# Patient Record
Sex: FEMALE | Race: WHITE | URBAN - METROPOLITAN AREA
[De-identification: names, ages, dates, MRNs, and addresses within clinical notes are randomized per-mention and may not be internally consistent; named-entity substitution may affect disease eponyms.]

---

## 2020-10-14 ENCOUNTER — Encounter (OUTPATIENT)
Dept: URBAN - METROPOLITAN AREA EXTERNAL CLINIC 14 | Facility: EXTERNAL CLINIC | Age: 70
End: 2020-10-14
Payer: MEDICARE

## 2020-10-14 PROCEDURE — 67042 VIT FOR MACULAR HOLE: CPT | Performed by: OPHTHALMOLOGY

## 2020-10-15 ENCOUNTER — POST-OPERATIVE VISIT (OUTPATIENT)
Dept: URBAN - METROPOLITAN AREA CLINIC 13 | Facility: CLINIC | Age: 70
End: 2020-10-15
Payer: MEDICARE

## 2020-10-15 DIAGNOSIS — H35.342 MACULAR HOLE OF LEFT EYE: Primary | ICD-10-CM

## 2020-10-15 PROCEDURE — 99024 POSTOP FOLLOW-UP VISIT: CPT | Performed by: OPHTHALMOLOGY

## 2020-10-15 ASSESSMENT — INTRAOCULAR PRESSURE
OD: 13
OS: 15

## 2020-10-15 NOTE — IMPRESSION/PLAN
Impression: S/P PPV,MP,GAS  X MH OD - 1 Day. Macular hole of left eye  H35.342. Retina attached. IOP WNL OU. No infection. Patient is starting Prednisolone and Ofloxacin QID OS. RTC: 1 WEEK POS.  Plan:

## 2020-11-05 ENCOUNTER — POST-OPERATIVE VISIT (OUTPATIENT)
Dept: URBAN - NONMETROPOLITAN AREA CLINIC 5 | Facility: CLINIC | Age: 70
End: 2020-11-05
Payer: MEDICARE

## 2020-11-05 DIAGNOSIS — H35.341 MACULAR CYST, HOLE, OR PSEUDOHOLE, RIGHT EYE: Primary | ICD-10-CM

## 2020-11-05 PROCEDURE — 99024 POSTOP FOLLOW-UP VISIT: CPT | Performed by: OPHTHALMOLOGY

## 2020-11-05 ASSESSMENT — INTRAOCULAR PRESSURE
OS: 15
OD: 16

## 2020-11-05 NOTE — IMPRESSION/PLAN
Impression: S/P PPV, MP, Gas x MH OD - 22 Days. Macular cyst, hole, or pseudohole, right eye  H35.341. Excellent post op course   Post operative instructions reviewed - Condition is improving -
OCT OD = MH closed with good contour Plan: No s/s of RD/infection VA/IOP acceptable Post-operative instructions and precautions Reviewed. Call ASAP with changes [phone] [address] for MRx -- will need cataract surgery in the future. Rec f/u with Dr. Vasquez Mini 3m OCT OU

## 2022-02-15 ENCOUNTER — OFFICE VISIT (OUTPATIENT)
Dept: URBAN - METROPOLITAN AREA CLINIC 8 | Facility: CLINIC | Age: 72
End: 2022-02-15
Payer: MEDICARE

## 2022-02-15 DIAGNOSIS — H52.223 REGULAR ASTIGMATISM, BILATERAL: ICD-10-CM

## 2022-02-15 DIAGNOSIS — H25.13 AGE-RELATED NUCLEAR CATARACT, BILATERAL: Primary | ICD-10-CM

## 2022-02-15 PROCEDURE — 99204 OFFICE O/P NEW MOD 45 MIN: CPT | Performed by: OPHTHALMOLOGY

## 2022-02-15 PROCEDURE — 92025 CPTRIZED CORNEAL TOPOGRAPHY: CPT | Performed by: OPHTHALMOLOGY

## 2022-02-15 PROCEDURE — 92136 OPHTHALMIC BIOMETRY: CPT | Performed by: OPHTHALMOLOGY

## 2022-02-15 ASSESSMENT — PACHYMETRY
OD: 25.35
OD: 3.71
OS: 25.31
OS: 3.71

## 2022-02-15 ASSESSMENT — VISUAL ACUITY
OS: 20/50
OD: 20/60

## 2022-02-15 ASSESSMENT — INTRAOCULAR PRESSURE
OS: 10
OD: 10

## 2022-02-15 NOTE — IMPRESSION/PLAN
Impression: Age-related nuclear cataract, bilateral: H25.13. Plan: Patient desires standard cataract surgery with a standard IOL. The patient understands they will need eyeglasses for some or all activities. The risks, benefits and alternatives to surgery were discussed with the patient at length. The patient desires to proceed with cataract surgery Right Eye First, Toric/Ora, no Relacs Imprimis drops

## 2022-02-28 ENCOUNTER — Encounter (OUTPATIENT)
Dept: URBAN - METROPOLITAN AREA EXTERNAL CLINIC 14 | Facility: EXTERNAL CLINIC | Age: 72
End: 2022-02-28
Payer: MEDICARE

## 2022-02-28 PROCEDURE — 66984 XCAPSL CTRC RMVL W/O ECP: CPT | Performed by: OPHTHALMOLOGY

## 2022-03-01 ENCOUNTER — POST-OPERATIVE VISIT (OUTPATIENT)
Dept: URBAN - METROPOLITAN AREA CLINIC 8 | Facility: CLINIC | Age: 72
End: 2022-03-01

## 2022-03-01 ASSESSMENT — INTRAOCULAR PRESSURE: OD: 20

## 2022-03-01 NOTE — IMPRESSION/PLAN
Impression: S/P PPV,MP,GAS VS SO X MH OD - 503 Days. Encounter for surgical aftercare following surgery on a sense organ  Z48.810 Plan: .  Post operative instructions reviewed - Condition is improving - --Continue Pred-Moxi-Nepaf QID

## 2022-03-10 ENCOUNTER — PRE-OPERATIVE VISIT (OUTPATIENT)
Dept: URBAN - METROPOLITAN AREA CLINIC 8 | Facility: CLINIC | Age: 72
End: 2022-03-10

## 2022-03-10 DIAGNOSIS — Z48.810 ENCOUNTER FOR SURGICAL AFTERCARE FOLLOWING SURGERY ON A SENSE ORGAN: ICD-10-CM

## 2022-03-10 DIAGNOSIS — H25.12 AGE-RELATED NUCLEAR CATARACT, LEFT EYE: Primary | ICD-10-CM

## 2022-03-10 ASSESSMENT — VISUAL ACUITY
OS: 20/50
OD: 20/25

## 2022-03-10 NOTE — IMPRESSION/PLAN
Impression: S/P Phaco - Toric IOL [id number] ReLacs/ORA OD - 10 Days. Encounter for surgical aftercare following surgery on a sense organ  Z48.810.  Plan: Post operative instructions reviewed - Condition is improving - schedule 2nd eye Imprimis TID right eye

## 2022-03-10 NOTE — IMPRESSION/PLAN
Impression: Age-related nuclear cataract, left eye: H25.12. Plan: Patient desires standard cataract surgery with a standard IOL. The patient understands they will need eyeglasses for some or all activities. The risks, benefits and alternatives to surgery were discussed with the patient at length. The patient desires to proceed with cataract surgery Left Eye Target -2.25 Toric/Ora, no Relacs Imprimis drops

## 2022-03-21 ENCOUNTER — Encounter (OUTPATIENT)
Dept: URBAN - METROPOLITAN AREA EXTERNAL CLINIC 14 | Facility: EXTERNAL CLINIC | Age: 72
End: 2022-03-21
Payer: MEDICARE

## 2022-03-21 PROCEDURE — 66984 XCAPSL CTRC RMVL W/O ECP: CPT | Performed by: OPHTHALMOLOGY

## 2022-03-22 ENCOUNTER — POST-OPERATIVE VISIT (OUTPATIENT)
Dept: URBAN - METROPOLITAN AREA CLINIC 8 | Facility: CLINIC | Age: 72
End: 2022-03-22
Payer: MEDICARE

## 2022-03-22 DIAGNOSIS — Z96.1 PRESENCE OF INTRAOCULAR LENS: Primary | ICD-10-CM

## 2022-03-22 ASSESSMENT — INTRAOCULAR PRESSURE: OS: 16

## 2022-03-22 NOTE — IMPRESSION/PLAN
Impression: S/P Phaco - PC IOL Near Aim OS - 1 Day. Presence of intraocular lens  Z96.1.  Plan: Post operative instructions reviewed - Condition is improving - --Continue Pred-Moxi-Nepaf

## 2022-03-31 ENCOUNTER — POST-OPERATIVE VISIT (OUTPATIENT)
Dept: URBAN - METROPOLITAN AREA CLINIC 8 | Facility: CLINIC | Age: 72
End: 2022-03-31
Payer: MEDICARE

## 2022-03-31 ASSESSMENT — INTRAOCULAR PRESSURE
OD: 11
OS: 10

## 2022-03-31 ASSESSMENT — VISUAL ACUITY
OS: 20/30
OD: 20/25

## 2022-03-31 NOTE — IMPRESSION/PLAN
Impression: S/P Phaco - PC IOL OS - 10 Days. Presence of intraocular lens  Z96.1.  Excellent post op course   Post operative instructions reviewed - Condition is improving - Plan: Observation Imprimis bid left eye

## 2022-10-06 ENCOUNTER — OFFICE VISIT (OUTPATIENT)
Dept: URBAN - METROPOLITAN AREA CLINIC 8 | Facility: CLINIC | Age: 72
End: 2022-10-06
Payer: MEDICARE

## 2022-10-06 DIAGNOSIS — H35.349 MACULAR HOLE: Primary | ICD-10-CM

## 2022-10-06 PROCEDURE — 99213 OFFICE O/P EST LOW 20 MIN: CPT | Performed by: OPHTHALMOLOGY

## 2022-10-06 ASSESSMENT — INTRAOCULAR PRESSURE
OS: 10
OD: 10

## 2022-10-06 NOTE — IMPRESSION/PLAN
Impression: Macular hole: H35.349. Plan: mac holes repaired ou. happy w monovision.  annual check or prn

## 2022-10-06 NOTE — IMPRESSION/PLAN
Impression: Macular hole: H35.349. Plan: Discussed diagnosis in detail with patient. No treatment is required at this time. Will continue to observe condition and or symptoms.